# Patient Record
Sex: FEMALE | Race: WHITE | URBAN - METROPOLITAN AREA
[De-identification: names, ages, dates, MRNs, and addresses within clinical notes are randomized per-mention and may not be internally consistent; named-entity substitution may affect disease eponyms.]

---

## 2019-02-20 ENCOUNTER — IMPORTED ENCOUNTER (OUTPATIENT)
Dept: URBAN - METROPOLITAN AREA CLINIC 50 | Facility: CLINIC | Age: 74
End: 2019-02-20

## 2020-02-19 ENCOUNTER — IMPORTED ENCOUNTER (OUTPATIENT)
Dept: URBAN - METROPOLITAN AREA CLINIC 50 | Facility: CLINIC | Age: 75
End: 2020-02-19

## 2021-02-19 ENCOUNTER — IMPORTED ENCOUNTER (OUTPATIENT)
Dept: URBAN - METROPOLITAN AREA CLINIC 50 | Facility: CLINIC | Age: 76
End: 2021-02-19

## 2021-04-17 ASSESSMENT — VISUAL ACUITY
OD_BAT: 20/30
OS_CC: 20/40
OD_CC: 20/25
OS_PH: 20/30
OD_PH: 20/25
OS_CC: 20/25-2
OD_CC: J1@ 15 IN
OS_CC: J1@ 15 IN
OD_BAT: 20/30-2
OD_CC: 20/30-1
OS_PH: 20/30
OS_OTHER: 20/40. 20/50.
OD_BAT: 20/30
OS_BAT: 20/40
OD_CC: 20/30
OD_OTHER: 20/30. 20/30.
OS_CC: J2@ 18 IN
OD_CC: J1+
OS_BAT: 20/40-2
OS_OTHER: 20/40. 20/40.
OS_CC: J1+
OD_CC: J2@ 18 IN
OD_OTHER: 20/30. 20/40.
OS_CC: 20/40
OD_OTHER: 20/30-2. 20/30-2.
OS_BAT: 20/40

## 2021-04-17 ASSESSMENT — TONOMETRY
OS_IOP_MMHG: 14
OS_IOP_MMHG: 15
OD_IOP_MMHG: 14
OS_IOP_MMHG: 15

## 2021-08-16 ENCOUNTER — PREPPED CHART (OUTPATIENT)
Dept: URBAN - METROPOLITAN AREA CLINIC 49 | Facility: CLINIC | Age: 76
End: 2021-08-16

## 2021-08-19 ENCOUNTER — COMPREHENSIVE EXAM (OUTPATIENT)
Dept: URBAN - METROPOLITAN AREA CLINIC 49 | Facility: CLINIC | Age: 76
End: 2021-08-19

## 2021-08-19 DIAGNOSIS — Z79.899: ICD-10-CM

## 2021-08-19 PROCEDURE — 92134 CPTRZ OPH DX IMG PST SGM RTA: CPT

## 2021-08-19 PROCEDURE — 92014 COMPRE OPH EXAM EST PT 1/>: CPT

## 2021-08-19 ASSESSMENT — TONOMETRY
OD_IOP_MMHG: 14
OS_IOP_MMHG: 15

## 2021-08-19 ASSESSMENT — VISUAL ACUITY
OS_CC: 20/60
OS_PH: 20/40
OU_CC: J1
OD_CC: 20/40

## 2021-08-19 NOTE — PATIENT DISCUSSION
OD: Post Dilation: Adnexa findings normal, Conjunctiva findings: white Cornea findings: Clear AC findings: Deep/Quiet Iris findings: Normal Lens findings: 3.5+ NS, PXE.

## 2021-08-19 NOTE — PATIENT DISCUSSION
OS: Post Dilation: Adnexa findings normal, Conjunctiva findings: white Cornea findings: Clear AC findings: Deep/Quiet Iris findings: Normal Lens findings: 3.5+ NS, PXE.